# Patient Record
Sex: MALE | Race: WHITE | NOT HISPANIC OR LATINO | Employment: OTHER | URBAN - METROPOLITAN AREA
[De-identification: names, ages, dates, MRNs, and addresses within clinical notes are randomized per-mention and may not be internally consistent; named-entity substitution may affect disease eponyms.]

---

## 2020-06-03 ENCOUNTER — HOSPITAL ENCOUNTER (OUTPATIENT)
Dept: GENERAL RADIOLOGY | Facility: HOSPITAL | Age: 70
Discharge: HOME | End: 2020-06-03
Admitting: PHYSICIAN ASSISTANT

## 2020-06-03 ENCOUNTER — TRANSCRIBE ORDERS (OUTPATIENT)
Dept: ADMINISTRATIVE | Facility: HOSPITAL | Age: 70
End: 2020-06-03

## 2020-06-03 ENCOUNTER — HOSPITAL ENCOUNTER (OUTPATIENT)
Dept: GENERAL RADIOLOGY | Facility: HOSPITAL | Age: 70
Discharge: HOME | End: 2020-06-03

## 2020-06-03 DIAGNOSIS — R10.9 ACUTE LEFT FLANK PAIN: ICD-10-CM

## 2020-06-03 DIAGNOSIS — R10.9 LEFT FLANK PAIN: Primary | ICD-10-CM

## 2020-06-03 DIAGNOSIS — R10.9 ACUTE LEFT FLANK PAIN: Primary | ICD-10-CM

## 2020-06-03 PROCEDURE — 74018 RADEX ABDOMEN 1 VIEW: CPT

## 2020-06-03 PROCEDURE — 72100 X-RAY EXAM L-S SPINE 2/3 VWS: CPT

## 2020-06-11 ENCOUNTER — HOSPITAL ENCOUNTER (OUTPATIENT)
Dept: ULTRASOUND IMAGING | Facility: HOSPITAL | Age: 70
Discharge: HOME | End: 2020-06-11
Admitting: PHYSICIAN ASSISTANT

## 2020-06-11 DIAGNOSIS — R10.9 LEFT FLANK PAIN: ICD-10-CM

## 2020-06-11 PROCEDURE — 76775 US EXAM ABDO BACK WALL LIM: CPT

## 2021-06-09 ENCOUNTER — HOSPITAL ENCOUNTER (OUTPATIENT)
Facility: HOSPITAL | Age: 71
Discharge: HOME | End: 2021-06-10
Attending: EMERGENCY MEDICINE | Admitting: INTERNAL MEDICINE

## 2021-06-09 ENCOUNTER — ON CAMPUS - OUTPATIENT (OUTPATIENT)
Dept: URBAN - METROPOLITAN AREA HOSPITAL 85 | Facility: HOSPITAL | Age: 71
End: 2021-06-09

## 2021-06-09 ENCOUNTER — APPOINTMENT (OUTPATIENT)
Dept: CT IMAGING | Facility: HOSPITAL | Age: 71
End: 2021-06-09

## 2021-06-09 DIAGNOSIS — R11.2 NAUSEA WITH VOMITING, UNSPECIFIED: ICD-10-CM

## 2021-06-09 DIAGNOSIS — R10.84 GENERALIZED ABDOMINAL PAIN: ICD-10-CM

## 2021-06-09 DIAGNOSIS — R10.13 EPIGASTRIC PAIN: ICD-10-CM

## 2021-06-09 DIAGNOSIS — K29.00 ACUTE GASTRITIS WITHOUT HEMORRHAGE, UNSPECIFIED GASTRITIS TYPE: ICD-10-CM

## 2021-06-09 DIAGNOSIS — R93.3 ABNORMAL FINDINGS ON DIAGNOSTIC IMAGING OF OTHER PARTS OF DI: ICD-10-CM

## 2021-06-09 DIAGNOSIS — K29.80 DUODENITIS: ICD-10-CM

## 2021-06-09 DIAGNOSIS — R10.84 GENERALIZED ABDOMINAL PAIN: Primary | ICD-10-CM

## 2021-06-09 LAB
ANION GAP SERPL CALCULATED.3IONS-SCNC: 11 MMOL/L (ref 5–15)
BACTERIA UR QL AUTO: ABNORMAL /HPF
BILIRUB UR QL STRIP: NEGATIVE
BUN SERPL-MCNC: 11 MG/DL (ref 8–23)
BUN/CREAT SERPL: 10.8 (ref 7–25)
CALCIUM SPEC-SCNC: 8.9 MG/DL (ref 8.6–10.5)
CHLORIDE SERPL-SCNC: 101 MMOL/L (ref 98–107)
CLARITY UR: ABNORMAL
CO2 SERPL-SCNC: 25 MMOL/L (ref 22–29)
COLOR UR: ABNORMAL
CREAT SERPL-MCNC: 1.02 MG/DL (ref 0.76–1.27)
DEPRECATED RDW RBC AUTO: 45.5 FL (ref 37–54)
ERYTHROCYTE [DISTWIDTH] IN BLOOD BY AUTOMATED COUNT: 14.5 % (ref 12.3–15.4)
GFR SERPL CREATININE-BSD FRML MDRD: 72 ML/MIN/1.73
GLUCOSE SERPL-MCNC: 207 MG/DL (ref 65–99)
GLUCOSE UR STRIP-MCNC: ABNORMAL MG/DL
HCT VFR BLD AUTO: 43.2 % (ref 37.5–51)
HGB BLD-MCNC: 14.5 G/DL (ref 13–17.7)
HGB UR QL STRIP.AUTO: ABNORMAL
HYALINE CASTS UR QL AUTO: ABNORMAL /LPF
KETONES UR QL STRIP: NEGATIVE
LEUKOCYTE ESTERASE UR QL STRIP.AUTO: NEGATIVE
LIPASE SERPL-CCNC: 17 U/L (ref 13–60)
LYMPHOCYTES # BLD MANUAL: 0.88 10*3/MM3 (ref 0.7–3.1)
LYMPHOCYTES NFR BLD MANUAL: 5 % (ref 19.6–45.3)
LYMPHOCYTES NFR BLD MANUAL: 5 % (ref 5–12)
MCH RBC QN AUTO: 29.7 PG (ref 26.6–33)
MCHC RBC AUTO-ENTMCNC: 33.7 G/DL (ref 31.5–35.7)
MCV RBC AUTO: 88.2 FL (ref 79–97)
MONOCYTES # BLD AUTO: 0.88 10*3/MM3 (ref 0.1–0.9)
MUCOUS THREADS URNS QL MICRO: ABNORMAL /HPF
NEUTROPHILS # BLD AUTO: 15.75 10*3/MM3 (ref 1.7–7)
NEUTROPHILS NFR BLD MANUAL: 89 % (ref 42.7–76)
NEUTS BAND NFR BLD MANUAL: 1 % (ref 0–5)
NITRITE UR QL STRIP: NEGATIVE
PH UR STRIP.AUTO: 6 [PH] (ref 5–8)
PLAT MORPH BLD: NORMAL
PLATELET # BLD AUTO: 259 10*3/MM3 (ref 140–450)
PMV BLD AUTO: 7.1 FL (ref 6–12)
POTASSIUM SERPL-SCNC: 4.5 MMOL/L (ref 3.5–5.2)
PROT UR QL STRIP: NEGATIVE
RBC # BLD AUTO: 4.89 10*6/MM3 (ref 4.14–5.8)
RBC # UR: ABNORMAL /HPF
RBC MORPH BLD: NORMAL
REF LAB TEST METHOD: ABNORMAL
SARS-COV-2 RNA PNL SPEC NAA+PROBE: NOT DETECTED
SCAN SLIDE: NORMAL
SODIUM SERPL-SCNC: 137 MMOL/L (ref 136–145)
SP GR UR STRIP: 1.03 (ref 1–1.03)
SQUAMOUS #/AREA URNS HPF: ABNORMAL /HPF
UROBILINOGEN UR QL STRIP: ABNORMAL
WBC # BLD AUTO: 17.5 10*3/MM3 (ref 3.4–10.8)
WBC MORPH BLD: NORMAL
WBC UR QL AUTO: ABNORMAL /HPF
WHOLE BLOOD HOLD SPECIMEN: NORMAL

## 2021-06-09 PROCEDURE — G0378 HOSPITAL OBSERVATION PER HR: HCPCS

## 2021-06-09 PROCEDURE — 63710000001 PANTOPRAZOLE 40 MG TABLET DELAYED-RELEASE: Performed by: INTERNAL MEDICINE

## 2021-06-09 PROCEDURE — 81001 URINALYSIS AUTO W/SCOPE: CPT | Performed by: EMERGENCY MEDICINE

## 2021-06-09 PROCEDURE — P9612 CATHETERIZE FOR URINE SPEC: HCPCS

## 2021-06-09 PROCEDURE — 87635 SARS-COV-2 COVID-19 AMP PRB: CPT | Performed by: EMERGENCY MEDICINE

## 2021-06-09 PROCEDURE — 99221 1ST HOSP IP/OBS SF/LOW 40: CPT | Performed by: SURGERY

## 2021-06-09 PROCEDURE — 80048 BASIC METABOLIC PNL TOTAL CA: CPT | Performed by: EMERGENCY MEDICINE

## 2021-06-09 PROCEDURE — A9270 NON-COVERED ITEM OR SERVICE: HCPCS | Performed by: INTERNAL MEDICINE

## 2021-06-09 PROCEDURE — 85025 COMPLETE CBC W/AUTO DIFF WBC: CPT | Performed by: EMERGENCY MEDICINE

## 2021-06-09 PROCEDURE — 96375 TX/PRO/DX INJ NEW DRUG ADDON: CPT

## 2021-06-09 PROCEDURE — C9803 HOPD COVID-19 SPEC COLLECT: HCPCS

## 2021-06-09 PROCEDURE — 99283 EMERGENCY DEPT VISIT LOW MDM: CPT

## 2021-06-09 PROCEDURE — 25010000002 MORPHINE PER 10 MG: Performed by: EMERGENCY MEDICINE

## 2021-06-09 PROCEDURE — 74176 CT ABD & PELVIS W/O CONTRAST: CPT

## 2021-06-09 PROCEDURE — 99203 OFFICE O/P NEW LOW 30 MIN: CPT | Performed by: NURSE PRACTITIONER

## 2021-06-09 PROCEDURE — 96374 THER/PROPH/DIAG INJ IV PUSH: CPT

## 2021-06-09 PROCEDURE — 83690 ASSAY OF LIPASE: CPT | Performed by: NURSE PRACTITIONER

## 2021-06-09 PROCEDURE — 25010000002 ONDANSETRON PER 1 MG: Performed by: EMERGENCY MEDICINE

## 2021-06-09 PROCEDURE — 85007 BL SMEAR W/DIFF WBC COUNT: CPT | Performed by: EMERGENCY MEDICINE

## 2021-06-09 RX ORDER — PANTOPRAZOLE SODIUM 40 MG/1
40 TABLET, DELAYED RELEASE ORAL
Status: DISCONTINUED | OUTPATIENT
Start: 2021-06-09 | End: 2021-06-10 | Stop reason: HOSPADM

## 2021-06-09 RX ORDER — MORPHINE SULFATE 4 MG/ML
2 INJECTION, SOLUTION INTRAMUSCULAR; INTRAVENOUS
Status: DISCONTINUED | OUTPATIENT
Start: 2021-06-09 | End: 2021-06-10 | Stop reason: HOSPADM

## 2021-06-09 RX ORDER — SODIUM CHLORIDE 9 MG/ML
INJECTION, SOLUTION INTRAVENOUS CONTINUOUS
Status: CANCELLED | OUTPATIENT
Start: 2021-06-09

## 2021-06-09 RX ORDER — MORPHINE SULFATE 4 MG/ML
2 INJECTION, SOLUTION INTRAMUSCULAR; INTRAVENOUS
Status: CANCELLED | OUTPATIENT
Start: 2021-06-09 | End: 2021-06-16

## 2021-06-09 RX ORDER — ONDANSETRON 2 MG/ML
4 INJECTION INTRAMUSCULAR; INTRAVENOUS ONCE
Status: COMPLETED | OUTPATIENT
Start: 2021-06-09 | End: 2021-06-09

## 2021-06-09 RX ORDER — SODIUM CHLORIDE 9 MG/ML
20 INJECTION, SOLUTION INTRAVENOUS CONTINUOUS
Status: DISCONTINUED | OUTPATIENT
Start: 2021-06-09 | End: 2021-06-10 | Stop reason: HOSPADM

## 2021-06-09 RX ORDER — MORPHINE SULFATE 4 MG/ML
2 INJECTION, SOLUTION INTRAMUSCULAR; INTRAVENOUS ONCE
Status: COMPLETED | OUTPATIENT
Start: 2021-06-09 | End: 2021-06-09

## 2021-06-09 RX ORDER — SODIUM CHLORIDE 0.9 % (FLUSH) 0.9 %
10 SYRINGE (ML) INJECTION AS NEEDED
Status: DISCONTINUED | OUTPATIENT
Start: 2021-06-09 | End: 2021-06-10 | Stop reason: HOSPADM

## 2021-06-09 RX ADMIN — SODIUM CHLORIDE 20 ML/HR: 9 INJECTION, SOLUTION INTRAVENOUS at 20:08

## 2021-06-09 RX ADMIN — PANTOPRAZOLE SODIUM 40 MG: 40 TABLET, DELAYED RELEASE ORAL at 17:25

## 2021-06-09 RX ADMIN — FAMOTIDINE 20 MG: 10 INJECTION INTRAVENOUS at 12:13

## 2021-06-09 RX ADMIN — MORPHINE SULFATE 2 MG: 4 INJECTION INTRAVENOUS at 10:45

## 2021-06-09 RX ADMIN — ONDANSETRON 4 MG: 2 INJECTION INTRAMUSCULAR; INTRAVENOUS at 10:46

## 2021-06-09 NOTE — ED NOTES
Patient complains of lower right side abdominal pain that started last night. Has had some vomiting and diarrhea     Smita Low, RN  06/09/21 3160

## 2021-06-09 NOTE — CONSULTS
Subjective   Alex Guevara is a 70 y.o. male.     History of present illness  This is a pleasant 70 yr/o male admitted through the ED with abdominal pain for the past 24 hrs. Work up in the ED shows marked inflammation in the RUQ and epigastric region. No fevers, no nausea, no vomiting. Clinically concerned for gastritis vs peptic ulcer disease.     History reviewed. No pertinent past medical history.    Past Surgical History:   Procedure Laterality Date   • EYE SURGERY         [unfilled]    No Known Allergies    History reviewed. No pertinent family history.    Social History     Socioeconomic History   • Marital status:      Spouse name: Not on file   • Number of children: Not on file   • Years of education: Not on file   • Highest education level: Not on file   Tobacco Use   • Smoking status: Never Smoker   Substance and Sexual Activity   • Alcohol use: Never   • Drug use: Never   • Sexual activity: Defer       The following portions of the patient's history were reviewed and updated as appropriate: allergies, current medications, past family history, past medical history, past social history, past surgical history and problem list.    Objective      Complete ROS done and unremarkable with exception of chief complaint and above noted exceptions.     Physical Exam: Pleasant 70 yr/o male. HEENT: unremarkable. Heart and lungs unremarkable. Abdomen tender in the RUQ, no true guarding or rebound, no palpable mass. Extremities with equal ROM and usage. Neuro with no focal deficit.     Impression: RUQ and epigastric pain, concerning for severe peptic disease.     Recommendation: GI consult with possible endoscopy. We will follow with you.       Assessment/Plan   Diagnoses and all orders for this visit:    1. Generalized abdominal pain (Primary)    2. Acute gastritis without hemorrhage, unspecified gastritis type  -     Case Request; Standing  -     Case Request    3. Duodenitis  -     Case Request; Standing  -      Case Request    4. Epigastric pain  -     Case Request; Standing  -     Case Request    Other orders  -     Insert peripheral IV; Standing  -     sodium chloride 0.9 % flush 10 mL  -     CBC & Differential; Standing  -     Basic Metabolic Panel; Standing  -     Urinalysis With Microscopic If Indicated (No Culture) - Urine, Catheter; Standing  -     Morphine sulfate (PF) injection 2 mg  -     ondansetron (ZOFRAN) injection 4 mg  -     Insert peripheral IV  -     CBC & Differential  -     Basic Metabolic Panel  -     Urinalysis With Microscopic If Indicated (No Culture) - Urine, Catheter  -     Extra Tubes; Standing  -     Extra Tubes  -     CT Abdomen Pelvis Without Contrast; Standing  -     CT Abdomen Pelvis Without Contrast  -     Urinalysis, Microscopic Only - Urine, Clean Catch; Standing  -     Urinalysis, Microscopic Only - Urine, Catheter  -     Manual Differential; Standing  -     Manual Differential  -     Family Medicine Consult; Standing  -     famotidine (PEPCID) injection 20 mg  -     Family Medicine Consult  -     Surgery (on-call MD unless specified); Standing  -     Surgery (on-call MD unless specified)  -     Gastroenterology (on-call MD unless specified); Standing  -     Gastroenterology (on-call MD unless specified)  -     Inpatient Admission; Standing  -     Inpatient Admission  -     COVID PRE-OP / PRE-PROCEDURE SCREENING ORDER (NO ISOLATION) - Swab, Nasopharynx; Standing  -     COVID PRE-OP / PRE-PROCEDURE SCREENING ORDER (NO ISOLATION) - Swab, Nasopharynx  -     pantoprazole (PROTONIX) EC tablet 40 mg  -     Implement Anesthesia Orders Day of Procedure; Standing  -     Obtain Informed Consent; Standing  -     NPO Diet; Standing  -     Saline Lock & Maintain IV Access; Standing  -     NPO Diet  -     CBC & Differential; Standing  -     Comprehensive Metabolic Panel; Standing  -     Lipase; Standing  -     Lipase; Standing  -     Lipase  -     Morphine sulfate (PF) injection 2 mg  -     sodium  chloride 0.9 % infusion  -     Morphine sulfate (PF) injection 2 mg  -     sodium chloride 0.9 % infusion                   Artemio Rosales DO  6/9/2021  19:12 EDT

## 2021-06-09 NOTE — CONSULTS
GI CONSULT  NOTE:    Referring Provider:  Dr. Orta    Chief complaint: Abdominal pain     Subjective .     History of present illness: Patient is a 70-year-old male with history of inguinal hernia repairs who presented to the emergency room today with complaints of epigastric pain and nausea/vomiting.  Patient reports that abdominal pain began last night and has been fairly constant.  He reports small amounts of nausea/vomiting with no hematemesis.  He feels his symptoms are related to food poisoning as he ate some slaw that was possibly outdated.  Denies eating from a restaurant.  He has no complaints of heartburn or dysphagia.  No fevers or chills.  He has been eating well and denies weight loss.  Bowels move 3-4 times daily which is his normal.  No diarrhea.  He denies bright red blood per rectum or melena.  No NSAID use.    Endo History:  Patient reports colonoscopy in [place] by Dr. Leong a couple years ago with 2 colon polyps.    Past Medical History:  History reviewed. No pertinent past medical history.    Past Surgical History:  Past Surgical History:   Procedure Laterality Date   • EYE SURGERY         Social History:  Social History     Tobacco Use   • Smoking status: Never Smoker   Substance Use Topics   • Alcohol use: Never   • Drug use: Never       Family History:  History reviewed. No pertinent family history.    Medications:  No medications prior to admission.       Scheduled Meds:   Continuous Infusions:   PRN Meds:.•  [COMPLETED] Insert peripheral IV **AND** sodium chloride    ALLERGIES:  Patient has no known allergies.    ROS:  Review of Systems   Constitutional: Negative for chills and fever.   Respiratory: Negative for cough and shortness of breath.    Cardiovascular: Negative for chest pain and palpitations.   Gastrointestinal: Positive for abdominal pain, nausea and vomiting. Negative for blood in stool.   Musculoskeletal: Negative for arthralgias and back pain.   Neurological: Negative for  "dizziness and weakness.   Psychiatric/Behavioral: Negative for agitation and confusion.       Objective     Vital Signs:   Visit Vitals  /72 (BP Location: Left arm, Patient Position: Lying)   Pulse 80   Temp 99.2 °F (37.3 °C) (Oral)   Resp 15   Ht 182.9 cm (72\")   Wt 69.8 kg (153 lb 14.1 oz)   SpO2 98%   BMI 20.87 kg/m²       Physical Exam:      General Appearance:    Awake and alert, in no acute distress   Head:    Normocephalic, without obvious abnormality, atraumatic   Eyes:            Conjunctivae normal, anicteric sclera   Ears:    Ears appear intact with no abnormalities noted   Throat:   No oral lesions, no thrush, oral mucosa moist   Neck:   No adenopathy, supple, no thyromegaly, no JVD   Lungs:     Respirations regular, even and unlabored       Chest Wall:    No abnormalities observed   Abdomen:     Soft, tender epigastric area, no rebound or guarding, non-distended, no hepatosplenomegaly   Rectal:     Deferred   Extremities:   Moves all extremities well, no edema, no cyanosis, no             redness   Pulses:   Pulses palpable and equal bilaterally   Skin:   No bleeding, bruising or rash, no jaundice   Lymph nodes:   No palpable adenopathy   Neurologic:   Cranial nerves 2 - 12 grossly intact, no asterixis, sensation intact       Results Review:   I reviewed the patient's labs and imaging.  CBC  Results from last 7 days   Lab Units 06/09/21  1041   RBC 10*6/mm3 4.89   WBC 10*3/mm3 17.50*   HEMOGLOBIN g/dL 14.5   PLATELETS 10*3/mm3 259       CMP  Results from last 7 days   Lab Units 06/09/21  1041   SODIUM mmol/L 137   POTASSIUM mmol/L 4.5   CHLORIDE mmol/L 101   CO2 mmol/L 25.0   BUN mg/dL 11   CREATININE mg/dL 1.02   GLUCOSE mg/dL 207*       Amylase and Lipase        CRP         Imaging Results (Last 24 Hours)     Procedure Component Value Units Date/Time    CT Abdomen Pelvis Without Contrast [[id number]] Collected: 06/09/21 1115     Updated: 06/09/21 1130    Narrative:      EXAM: CT ABDOMEN PELVIS WO " CONTRAST-     DATE OF EXAM: 6/9/2021 11:06 AM     INDICATION: Abdominal pain, acute, nonlocalized.  Nausea and vomiting     COMPARISON: None available     TECHNIQUE: Contiguous axial CT images were obtained from the lung bases  to the pubic symphysis without contrast. Sagittal and coronal  reconstructions were performed.  Automated exposure control and  iterative reconstruction methods were used.     FINDINGS:  The lack of intravenous contrast limits the evaluation of visceral and  vascular structures.     The heart size is normal. There is no pericardial effusion. The lung  bases are clear. The liver is normal in size and contour. There is a  low-density lesion within the right hepatic lobe with Hounsfield unit  density of 9, likely representing a small hepatic cyst.     The gallbladder is collapsed which limits evaluation. There is no  intrahepatic or extra hepatic biliary ductal dilatation. The spleen and  adrenal glands appear within normal limits. There is mild pancreatic  parenchymal volume loss.     The kidneys are symmetric in size. There is no hydronephrosis. There is  a 4 mm stone within the dependent portion of the right urinary bladder  which may reflect a previously passed stone. Correlate clinically for  history of renal stones/renal colic. There is a tiny focus of gas within  the nondependent portion of the bladder which may reflect recent  instrumentation. The prostate is enlarged.     There is a tiny sliding-type hiatal hernia. There is an inflammatory  process centered around the distal aspect of the stomach and first  portion of the duodenum. This is suspicious for underlying ulcer or  inflammation of the distal stomach/duodenum. A clear ulceration is not  well seen on the noncontrast examination. The inflammation appears  distinctly separate from the pancreas making pancreatitis an unlikely  source. There is no free air to suggest perforation. There is no  abnormal fluid collection or abscess.  The remainder of the small bowel  is normal in caliber without evidence of obstruction. The cecum is  displaced medially likely related to a long mesenteric pedicle. There is  no clear evidence of volvulus at this time. The appendix is not clearly  visualized. There is sigmoid diverticulosis without acute  diverticulitis. The aorta is normal in caliber without evidence of  aneurysm formation. There is no lymphadenopathy within the abdomen or  pelvis. There are small mesenteric reactive lymph nodes. There is  evidence of prior bilateral inguinal hernia repair. There are no acute  osseous findings. There are degenerative changes of the lumbar spine and  pelvis.       Impression:      1. Localized inflammatory stranding surrounding the distal stomach and  proximal duodenum. This is suspicious for ulcer or gastritis/duodenitis.  No definite free air to suggest a perforated ulcer. The inflammation  appears to involve portions of the hepatic flexure.  2. Small 4 mm stone within the dependent portion of the urinary bladder.  This could relate to a recently passed stone if there is history of  renal stones or renal colic or a stone formed due to stasis within the  urinary bladder. Correlate clinically with history and symptoms.  3. Medialization of the cecum to the left lower quadrant without  evidence of volvulus at this time.  4. Sigmoid diverticulosis without acute diverticulitis.           Electronically Signed By-Kenyon Hyde MD On:6/9/2021 11:28 AM  This report was finalized on [id number] by  Kenyon Hyde MD.            ASSESSMENT AND PLAN:    Epigastric pain  Nausea/vomiting  Abnormal CT suggesting inflammatory stranding of distal stomach and proximal duodenum  Hematuria  History of bilateral inguinal hernia repairs    Active Problems:    Generalized abdominal pain     Plan:  70-year-old male presented to the hospital with epigastric pain that began last night.  Also has associated nausea/vomiting.  CT  suggests inflammatory stranding surrounding distal stomach and proximal duodenum.  Concern for ulcer and plan to proceed with EGD in the morning for further evaluation.  Hemoglobin normal at 14.5.  WBC 17.5.  Start PPI twice daily.  Patient can have clear liquid diet now and n.p.o. at midnight.  Supportive care.    I discussed the patients findings and my recommendations with the patient.  Tracie Fowler, MAHAMED  06/09/21  16:13 EDT

## 2021-06-09 NOTE — ED NOTES
Patient flagged for simple sepsis Dr Barber notified and said not to worry about that. Patient did not need sepsis work up done     Smita Low RN  06/09/21 1828

## 2021-06-09 NOTE — ED PROVIDER NOTES
Subjective   Patient is a 7-year-old male complaining of 24-hour history of abdominal pain and vomiting.  The pain is moderate to severe and constant.  There is no radiation of pain.  Nuys cough fever shortness of breath diarrhea dysuria or other complaint.          Review of Systems  Negative for headache ears throat cough fever chest pain shortness of breath diarrhea dysuria achiness weight loss or other complaint.  A complete reassessment was obtained and is otherwise negative  History reviewed. No pertinent past medical history.    No Known Allergies    Past Surgical History:   Procedure Laterality Date   • EYE SURGERY         History reviewed. No pertinent family history.    Social History     Socioeconomic History   • Marital status:      Spouse name: Not on file   • Number of children: Not on file   • Years of education: Not on file   • Highest education level: Not on file   Tobacco Use   • Smoking status: Never Smoker   Substance and Sexual Activity   • Alcohol use: Never   • Drug use: Never   • Sexual activity: Defer           Objective   Physical Exam  HEENT exam shows TMs to be clear.  Oropharynx clear moist.  Sclerae nonicteric.  Neck has no adenopathy JVD or bruits.  Lungs are clear.  Heart has a regular rate rhythm without murmur gallop.  Chest is nontender.  Abdomen is soft with tenderness on palpation throughout.  Patient has rebound and guarding.  Back has no CVA tenderness.  Procedures           ED Course      Results for orders placed or performed during the hospital encounter of 06/09/21   Basic Metabolic Panel    Specimen: Blood   Result Value Ref Range    Glucose 207 (H) 65 - 99 mg/dL    BUN 11 8 - 23 mg/dL    Creatinine 1.02 0.76 - 1.27 mg/dL    Sodium 137 136 - 145 mmol/L    Potassium 4.5 3.5 - 5.2 mmol/L    Chloride 101 98 - 107 mmol/L    CO2 25.0 22.0 - 29.0 mmol/L    Calcium 8.9 8.6 - 10.5 mg/dL    eGFR Non African Amer 72 >60 mL/min/1.73    BUN/Creatinine Ratio 10.8 7.0 - 25.0     Anion Gap 11.0 5.0 - 15.0 mmol/L   Urinalysis With Microscopic If Indicated (No Culture) - Urine, Catheter    Specimen: Urine, Catheter   Result Value Ref Range    Color, UA Dark Yellow (A) Yellow, Straw    Appearance, UA Cloudy (A) Clear    pH, UA 6.0 5.0 - 8.0    Specific Gravity, UA 1.026 1.005 - 1.030    Glucose,  mg/dL (1+) (A) Negative    Ketones, UA Negative Negative    Bilirubin, UA Negative Negative    Blood, UA Large (3+) (A) Negative    Protein, UA Negative Negative    Leuk Esterase, UA Negative Negative    Nitrite, UA Negative Negative    Urobilinogen, UA 1.0 E.U./dL 0.2 - 1.0 E.U./dL   CBC Auto Differential    Specimen: Blood   Result Value Ref Range    WBC 17.50 (H) 3.40 - 10.80 10*3/mm3    RBC 4.89 4.14 - 5.80 10*6/mm3    Hemoglobin 14.5 13.0 - 17.7 g/dL    Hematocrit 43.2 37.5 - 51.0 %    MCV 88.2 79.0 - 97.0 fL    MCH 29.7 26.6 - 33.0 pg    MCHC 33.7 31.5 - 35.7 g/dL    RDW 14.5 12.3 - 15.4 %    RDW-SD 45.5 37.0 - 54.0 fl    MPV 7.1 6.0 - 12.0 fL    Platelets 259 140 - 450 10*3/mm3   Scan Slide    Specimen: Blood   Result Value Ref Range    Scan Slide     Urinalysis, Microscopic Only - Urine, Catheter    Specimen: Urine, Catheter   Result Value Ref Range    RBC, UA 21-30 (A) None Seen /HPF    WBC, UA 0-2 (A) None Seen /HPF    Bacteria, UA None Seen None Seen /HPF    Squamous Epithelial Cells, UA None Seen None Seen, 0-2 /HPF    Hyaline Casts, UA 0-2 None Seen /LPF    Mucus, UA Small/1+ (A) None Seen, Trace /HPF    Methodology Manual Light Microscopy    Manual Differential    Specimen: Blood   Result Value Ref Range    Neutrophil % 89.0 (H) 42.7 - 76.0 %    Lymphocyte % 5.0 (L) 19.6 - 45.3 %    Monocyte % 5.0 5.0 - 12.0 %    Bands %  1.0 0.0 - 5.0 %    Neutrophils Absolute 15.75 (H) 1.70 - 7.00 10*3/mm3    Lymphocytes Absolute 0.88 0.70 - 3.10 10*3/mm3    Monocytes Absolute 0.88 0.10 - 0.90 10*3/mm3    RBC Morphology Normal Normal    WBC Morphology Normal Normal    Platelet Morphology Normal  Normal   Light Blue Top   Result Value Ref Range    Extra Tube hold for add-on      CT Abdomen Pelvis Without Contrast    Result Date: 6/9/2021  1. Localized inflammatory stranding surrounding the distal stomach and proximal duodenum. This is suspicious for ulcer or gastritis/duodenitis. No definite free air to suggest a perforated ulcer. The inflammation appears to involve portions of the hepatic flexure. 2. Small 4 mm stone within the dependent portion of the urinary bladder. This could relate to a recently passed stone if there is history of renal stones or renal colic or a stone formed due to stasis within the urinary bladder. Correlate clinically with history and symptoms. 3. Medialization of the cecum to the left lower quadrant without evidence of volvulus at this time. 4. Sigmoid diverticulosis without acute diverticulitis.    Electronically Signed By-Kenyon Hyde MD On:6/9/2021 11:28 AM This report was finalized on [id number] by  Kenyon Hyde MD.                                         MDM  Number of Diagnoses or Management Options  Diagnosis management comments: Patient is findings consistent with gastritis and duodenitis based on CT scan.  Patient does have leukocytosis.  There is no evidence of free air or abscess formation.  No obstruction is noted.  Patient was given morphine Zofran and Pepcid.  He has continued moderate to severe pain.  Will be admitted for pain control and evaluation both by GI and surgery.  I did speak to the patient family physician.       Amount and/or Complexity of Data Reviewed  Clinical lab tests: reviewed  Tests in the radiology section of CPT®: reviewed    Risk of Complications, Morbidity, and/or Mortality  Presenting problems: high  Diagnostic procedures: high  Management options: high    Patient Progress  Patient progress: stable      Final diagnoses:   Generalized abdominal pain   Acute gastritis without hemorrhage, unspecified gastritis type   Duodenitis        ED Disposition  ED Disposition     ED Disposition Condition Comment    Decision to Admit            No follow-up provider specified.       Medication List      No changes were made to your prescriptions during this visit.          David Barber MD  06/09/21 4590

## 2021-06-10 ENCOUNTER — ON CAMPUS - OUTPATIENT (OUTPATIENT)
Dept: URBAN - METROPOLITAN AREA HOSPITAL 85 | Facility: HOSPITAL | Age: 71
End: 2021-06-10
Payer: COMMERCIAL

## 2021-06-10 ENCOUNTER — ANESTHESIA EVENT (OUTPATIENT)
Dept: GASTROENTEROLOGY | Facility: HOSPITAL | Age: 71
End: 2021-06-10

## 2021-06-10 ENCOUNTER — ANESTHESIA (OUTPATIENT)
Dept: GASTROENTEROLOGY | Facility: HOSPITAL | Age: 71
End: 2021-06-10

## 2021-06-10 VITALS
RESPIRATION RATE: 18 BRPM | HEIGHT: 72 IN | OXYGEN SATURATION: 97 % | DIASTOLIC BLOOD PRESSURE: 58 MMHG | TEMPERATURE: 98.6 F | BODY MASS INDEX: 20.84 KG/M2 | SYSTOLIC BLOOD PRESSURE: 110 MMHG | WEIGHT: 153.88 LBS | HEART RATE: 74 BPM

## 2021-06-10 VITALS — HEART RATE: 70 BPM | SYSTOLIC BLOOD PRESSURE: 116 MMHG | DIASTOLIC BLOOD PRESSURE: 60 MMHG | OXYGEN SATURATION: 100 %

## 2021-06-10 DIAGNOSIS — R10.13 EPIGASTRIC PAIN: ICD-10-CM

## 2021-06-10 DIAGNOSIS — K29.80 DUODENITIS WITHOUT BLEEDING: ICD-10-CM

## 2021-06-10 DIAGNOSIS — K29.00 ACUTE GASTRITIS WITHOUT BLEEDING: ICD-10-CM

## 2021-06-10 DIAGNOSIS — R93.3 ABNORMAL FINDINGS ON DIAGNOSTIC IMAGING OF OTHER PARTS OF DI: ICD-10-CM

## 2021-06-10 LAB
ALBUMIN SERPL-MCNC: 3.4 G/DL (ref 3.5–5.2)
ALBUMIN/GLOB SERPL: 1.4 G/DL
ALP SERPL-CCNC: 60 U/L (ref 39–117)
ALT SERPL W P-5'-P-CCNC: 9 U/L (ref 1–41)
ANION GAP SERPL CALCULATED.3IONS-SCNC: 9 MMOL/L (ref 5–15)
AST SERPL-CCNC: 10 U/L (ref 1–40)
BASOPHILS # BLD AUTO: 0 10*3/MM3 (ref 0–0.2)
BASOPHILS NFR BLD AUTO: 0.2 % (ref 0–1.5)
BILIRUB SERPL-MCNC: 0.8 MG/DL (ref 0–1.2)
BUN SERPL-MCNC: 12 MG/DL (ref 8–23)
BUN/CREAT SERPL: 12.9 (ref 7–25)
CALCIUM SPEC-SCNC: 8.5 MG/DL (ref 8.6–10.5)
CHLORIDE SERPL-SCNC: 102 MMOL/L (ref 98–107)
CO2 SERPL-SCNC: 26 MMOL/L (ref 22–29)
CREAT SERPL-MCNC: 0.93 MG/DL (ref 0.76–1.27)
DEPRECATED RDW RBC AUTO: 44.2 FL (ref 37–54)
EOSINOPHIL # BLD AUTO: 0 10*3/MM3 (ref 0–0.4)
EOSINOPHIL NFR BLD AUTO: 0.2 % (ref 0.3–6.2)
ERYTHROCYTE [DISTWIDTH] IN BLOOD BY AUTOMATED COUNT: 14.4 % (ref 12.3–15.4)
GFR SERPL CREATININE-BSD FRML MDRD: 80 ML/MIN/1.73
GLOBULIN UR ELPH-MCNC: 2.5 GM/DL
GLUCOSE SERPL-MCNC: 103 MG/DL (ref 65–99)
HCT VFR BLD AUTO: 39.3 % (ref 37.5–51)
HGB BLD-MCNC: 13.2 G/DL (ref 13–17.7)
LIPASE SERPL-CCNC: 22 U/L (ref 13–60)
LYMPHOCYTES # BLD AUTO: 1.5 10*3/MM3 (ref 0.7–3.1)
LYMPHOCYTES NFR BLD AUTO: 9.9 % (ref 19.6–45.3)
MCH RBC QN AUTO: 29.7 PG (ref 26.6–33)
MCHC RBC AUTO-ENTMCNC: 33.6 G/DL (ref 31.5–35.7)
MCV RBC AUTO: 88.6 FL (ref 79–97)
MONOCYTES # BLD AUTO: 0.8 10*3/MM3 (ref 0.1–0.9)
MONOCYTES NFR BLD AUTO: 5.4 % (ref 5–12)
NEUTROPHILS NFR BLD AUTO: 13 10*3/MM3 (ref 1.7–7)
NEUTROPHILS NFR BLD AUTO: 84.3 % (ref 42.7–76)
NRBC BLD AUTO-RTO: 0.1 /100 WBC (ref 0–0.2)
PLATELET # BLD AUTO: 212 10*3/MM3 (ref 140–450)
PMV BLD AUTO: 7.4 FL (ref 6–12)
POTASSIUM SERPL-SCNC: 4.4 MMOL/L (ref 3.5–5.2)
PROCALCITONIN SERPL-MCNC: 0.79 NG/ML (ref 0–0.25)
PROT SERPL-MCNC: 5.9 G/DL (ref 6–8.5)
RBC # BLD AUTO: 4.43 10*6/MM3 (ref 4.14–5.8)
SODIUM SERPL-SCNC: 137 MMOL/L (ref 136–145)
WBC # BLD AUTO: 15.4 10*3/MM3 (ref 3.4–10.8)

## 2021-06-10 PROCEDURE — A9270 NON-COVERED ITEM OR SERVICE: HCPCS | Performed by: INTERNAL MEDICINE

## 2021-06-10 PROCEDURE — 63710000001 PANTOPRAZOLE 40 MG TABLET DELAYED-RELEASE: Performed by: INTERNAL MEDICINE

## 2021-06-10 PROCEDURE — 88342 IMHCHEM/IMCYTCHM 1ST ANTB: CPT | Performed by: INTERNAL MEDICINE

## 2021-06-10 PROCEDURE — 80053 COMPREHEN METABOLIC PANEL: CPT | Performed by: NURSE PRACTITIONER

## 2021-06-10 PROCEDURE — 85025 COMPLETE CBC W/AUTO DIFF WBC: CPT | Performed by: NURSE PRACTITIONER

## 2021-06-10 PROCEDURE — 88305 TISSUE EXAM BY PATHOLOGIST: CPT | Performed by: INTERNAL MEDICINE

## 2021-06-10 PROCEDURE — G0378 HOSPITAL OBSERVATION PER HR: HCPCS

## 2021-06-10 PROCEDURE — 25010000002 PROPOFOL 10 MG/ML EMULSION: Performed by: ANESTHESIOLOGY

## 2021-06-10 PROCEDURE — 83690 ASSAY OF LIPASE: CPT | Performed by: NURSE PRACTITIONER

## 2021-06-10 PROCEDURE — 43239 EGD BIOPSY SINGLE/MULTIPLE: CPT | Performed by: INTERNAL MEDICINE

## 2021-06-10 PROCEDURE — 84145 PROCALCITONIN (PCT): CPT | Performed by: FAMILY MEDICINE

## 2021-06-10 RX ORDER — ONDANSETRON 2 MG/ML
4 INJECTION INTRAMUSCULAR; INTRAVENOUS EVERY 6 HOURS PRN
Status: DISCONTINUED | OUTPATIENT
Start: 2021-06-10 | End: 2021-06-10 | Stop reason: HOSPADM

## 2021-06-10 RX ORDER — LIDOCAINE HYDROCHLORIDE 10 MG/ML
INJECTION, SOLUTION EPIDURAL; INFILTRATION; INTRACAUDAL; PERINEURAL AS NEEDED
Status: DISCONTINUED | OUTPATIENT
Start: 2021-06-10 | End: 2021-06-10 | Stop reason: SURG

## 2021-06-10 RX ORDER — PROPOFOL 10 MG/ML
VIAL (ML) INTRAVENOUS AS NEEDED
Status: DISCONTINUED | OUTPATIENT
Start: 2021-06-10 | End: 2021-06-10 | Stop reason: SURG

## 2021-06-10 RX ORDER — DEXTROSE AND SODIUM CHLORIDE 5; .9 G/100ML; G/100ML
125 INJECTION, SOLUTION INTRAVENOUS CONTINUOUS
Status: DISCONTINUED | OUTPATIENT
Start: 2021-06-10 | End: 2021-06-10 | Stop reason: HOSPADM

## 2021-06-10 RX ORDER — PANTOPRAZOLE SODIUM 40 MG/1
40 TABLET, DELAYED RELEASE ORAL
Qty: 60 TABLET | Refills: 3 | Status: SHIPPED | OUTPATIENT
Start: 2021-06-11

## 2021-06-10 RX ADMIN — PROPOFOL 80 MG: 10 INJECTION, EMULSION INTRAVENOUS at 10:28

## 2021-06-10 RX ADMIN — LIDOCAINE HYDROCHLORIDE 50 MG: 10 INJECTION, SOLUTION EPIDURAL; INFILTRATION; INTRACAUDAL; PERINEURAL at 10:28

## 2021-06-10 RX ADMIN — PROPOFOL 20 MG: 10 INJECTION, EMULSION INTRAVENOUS at 10:31

## 2021-06-10 RX ADMIN — PANTOPRAZOLE SODIUM 40 MG: 40 TABLET, DELAYED RELEASE ORAL at 16:51

## 2021-06-10 RX ADMIN — DEXTROSE AND SODIUM CHLORIDE 125 ML/HR: 5; 900 INJECTION, SOLUTION INTRAVENOUS at 11:10

## 2021-06-10 NOTE — PLAN OF CARE
Goal Outcome Evaluation:         EGD this am and tolerated well. Pt is wanting to go home today. Notified Dr. Orta and he said he could possibly discharge later today. Tolerating regular diet. Son at bedside.

## 2021-06-10 NOTE — CASE MANAGEMENT/SOCIAL WORK
Discharge Planning Assessment   [place]     Patient Name: Alex Guevara  MRN: [id number]  Today's Date: 6/10/2021    Admit Date: 6/9/2021    Discharge Needs Assessment     Row Name 06/10/21 1329       Living Environment    Lives With  child(luiz), adult    Current Living Arrangements  home/apartment/condo    Primary Care Provided by  self    Provides Primary Care For  no one    Family Caregiver if Needed  child(luiz), adult    Quality of Family Relationships  helpful    Able to Return to Prior Arrangements  yes       Resource/Environmental Concerns    Resource/Environmental Concerns  none    Transportation Concerns  car, none       Transition Planning    Patient/Family Anticipates Transition to  home with family    Patient/Family Anticipated Services at Transition  none    Transportation Anticipated  family or friend will provide       Discharge Needs Assessment    Readmission Within the Last 30 Days  no previous admission in last 30 days    Equipment Currently Used at Home  none    Concerns to be Addressed  denies needs/concerns at this time;no discharge needs identified    Anticipated Changes Related to Illness  none    Equipment Needed After Discharge  none    Provided Post Acute Provider List?  N/A        Discharge Plan     Row Name 06/10/21 7487       Plan    Plan  Anticipate routine home    Patient/Family in Agreement with Plan  yes    Plan Comments  Met with patient at bedside, reports he lives at home with son. I with ADLs, still drives. Son to transport home on d/c. PCP and pharmacy confirmed. No issues affording food or medications. Currently denies any d/c needs or concerns. DC barriers: potential d/c home today if able to tolerate PO diet          Expected Discharge Date and Time     Expected Discharge Date Expected Discharge Time    Jason 10, 2021         Demographic Summary     Row Name 06/10/21 1329       General Information    Admission Type  inpatient    Arrived From  emergency department    Required  Notices Provided  Important Message from Medicare    Referral Source  admission list    Reason for Consult  discharge planning    Preferred Language  English        Functional Status     Row Name 06/10/21 1329       Functional Status    Usual Activity Tolerance  moderate    Current Activity Tolerance  moderate       Functional Status, IADL    Medications  independent    Meal Preparation  independent    Housekeeping  independent    Laundry  independent    Shopping  independent          Patient Forms     Row Name 06/10/21 1331       Patient Forms    Important Message from Medicare (IMM)  Delivered IM delivered 6/9        Met with patient in room wearing PPE: mask, goggles.      Maintained distance greater than six feet and spent less than 15 minutes in the room.          Angela Hendrickson RN

## 2021-06-10 NOTE — H&P
Patient Care Team:  Ki Orta MD as PCP - General (Family Medicine)    Chief Conplaint  Subjective     The patient is a 70 y.o. male presents with acute abdominal pain primarily in the midepigastrium with nausea and vomiting with an abnormal CT of the gastric antrum.    HPI  The patient was in his usual state of health until 1 day prior to presentation when he began to experience some epigastric pain with nausea and vomiting.  He denied hematemesis or hyperpyrexia or diarrhea.  He denied hematochezia lower extremity erythema edema tenderness hemoptysis orthopnea or other constitutional complaint  Review of Systems  Review of Systems   Constitutional: Positive for activity change and appetite change.   Respiratory: Negative.    Cardiovascular: Negative.    Gastrointestinal: Positive for abdominal pain, nausea and vomiting. Negative for anal bleeding, blood in stool and diarrhea.   Genitourinary: Negative.    Neurological: Negative.        History  History reviewed. No pertinent past medical history.  Past Surgical History:   Procedure Laterality Date   • EYE SURGERY       History reviewed. No pertinent family history.  Social History     Tobacco Use   • Smoking status: Never Smoker   Substance Use Topics   • Alcohol use: Never   • Drug use: Never     Allergies:  Patient has no known allergies.    Objective     Vital Signs  Temp:  [97.9 °F (36.6 °C)-99.4 °F (37.4 °C)] 98.9 °F (37.2 °C)  Heart Rate:  [70-80] 70  Resp:  [14-16] 16  BP: (112-135)/(45-72) 123/67      Physical Exam:   Physical Exam  Vitals and nursing note reviewed.   Constitutional:       Appearance: Normal appearance.   Cardiovascular:      Rate and Rhythm: Normal rate.   Pulmonary:      Breath sounds: Normal breath sounds.   Abdominal:      General: There is no distension.      Tenderness: There is abdominal tenderness. There is no guarding or rebound.   Musculoskeletal:         General: Normal range of motion.   Skin:     General: Skin is warm.    Neurological:      General: No focal deficit present.      Mental Status: He is alert and oriented to person, place, and time.              Results Review:   CBC    Results from last 7 days   Lab Units 06/10/21  0314 06/09/21  1041   WBC 10*3/mm3 15.40* 17.50*   HEMOGLOBIN g/dL 13.2 14.5   PLATELETS 10*3/mm3 212 259     BMP   Results from last 7 days   Lab Units 06/10/21  0314 06/09/21  1041   SODIUM mmol/L 137 137   POTASSIUM mmol/L 4.4 4.5   CHLORIDE mmol/L 102 101   CO2 mmol/L 26.0 25.0   BUN mg/dL 12 11   CREATININE mg/dL 0.93 1.02   GLUCOSE mg/dL 103* 207*     Cr Clearance Estimated Creatinine Clearance: 73 mL/min (by C-G formula based on SCr of 0.93 mg/dL).  Coag     HbA1C No results found for: HGBA1C  Blood Glucose No results found for: POCGLU  Infection     CMP   Results from last 7 days   Lab Units 06/10/21  0314 06/09/21  1041   SODIUM mmol/L 137 137   POTASSIUM mmol/L 4.4 4.5   CHLORIDE mmol/L 102 101   CO2 mmol/L 26.0 25.0   BUN mg/dL 12 11   CREATININE mg/dL 0.93 1.02   GLUCOSE mg/dL 103* 207*   ALBUMIN g/dL 3.40*  --    BILIRUBIN mg/dL 0.8  --    ALK PHOS U/L 60  --    AST (SGOT) U/L 10  --    ALT (SGPT) U/L 9  --    LIPASE U/L 22 17     UA    Results from last 7 days   Lab Units 06/09/21  1058   NITRITE UA  Negative   WBC UA /HPF 0-2*   BACTERIA UA /HPF None Seen   SQUAM EPITHEL UA /HPF None Seen     Radiology(recent) CT Abdomen Pelvis Without Contrast    Result Date: 6/9/2021  1. Localized inflammatory stranding surrounding the distal stomach and proximal duodenum. This is suspicious for ulcer or gastritis/duodenitis. No definite free air to suggest a perforated ulcer. The inflammation appears to involve portions of the hepatic flexure. 2. Small 4 mm stone within the dependent portion of the urinary bladder. This could relate to a recently passed stone if there is history of renal stones or renal colic or a stone formed due to stasis within the urinary bladder. Correlate clinically with history and  symptoms. 3. Medialization of the cecum to the left lower quadrant without evidence of volvulus at this time. 4. Sigmoid diverticulosis without acute diverticulitis.    Electronically Signed By-Kenyon Hyde MD On:6/9/2021 11:28 AM This report was finalized on [id number] by  Kenyon Hyde MD.       Assessment:      Generalized abdominal pain    Acute gastritis without hemorrhage    Duodenitis    Epigastric pain  Reactive hyperglycemia    Plan:  Bowel rest//EGD today//plans to follow  Expected Length of Stay 3 days    I discussed the patients findings and my recommendations with patient and nursing staff.     Ki Orta MD  06/10/21  07:56 EDT

## 2021-06-10 NOTE — DISCHARGE SUMMARY
Date of Discharge:  6/10/2021    Discharge Diagnosis:       Generalized abdominal pain  Acute gastritis without hemorrhage  Duodenitis  Reactive hyperglycemia    Presenting Problem/History of Present Illness  Active Hospital Problems    Diagnosis  POA   • Generalized abdominal pain [R10.84]  Yes   • Acute gastritis without hemorrhage [K29.00]  Unknown   • Duodenitis [K29.80]  Unknown   • Epigastric pain [R10.13]  Unknown      Resolved Hospital Problems   No resolved problems to display.          Hospital Course  Patient is a 70 y.o. male who presented with acute abdominal pain.  He received bowel rest and PPI therapy.  He underwent EGD and was found to have an acute gastritis.  He will be sent home on PPI therapy and will F/U with us in one week and with GI in one month.  He will be placed on a bland diet.      Procedures Performed    Procedure(s):  ESOPHAGOGASTRODUODENOSCOPY with random gastric biopsies  -------------------       Consults:   Consults     Date and Time Order Name Status Description    6/9/2021 12:05 PM Gastroenterology (on-call MD unless specified)      6/9/2021 12:05 PM Surgery (on-call MD unless specified)      6/9/2021 11:51 AM Family Medicine Consult Completed           Pertinent Test Results:CT Abdomen Pelvis Without Contrast    Result Date: 6/9/2021  1. Localized inflammatory stranding surrounding the distal stomach and proximal duodenum. This is suspicious for ulcer or gastritis/duodenitis. No definite free air to suggest a perforated ulcer. The inflammation appears to involve portions of the hepatic flexure. 2. Small 4 mm stone within the dependent portion of the urinary bladder. This could relate to a recently passed stone if there is history of renal stones or renal colic or a stone formed due to stasis within the urinary bladder. Correlate clinically with history and symptoms. 3. Medialization of the cecum to the left lower quadrant without evidence of volvulus at this time. 4. Sigmoid  diverticulosis without acute diverticulitis.    Electronically Signed By-Kenyon Hyed MD On:6/9/2021 11:28 AM This report was finalized on [id number] by  Kenyon Hyde MD.      Imaging Results (Last 7 Days)     Procedure Component Value Units Date/Time    CT Abdomen Pelvis Without Contrast [[id number]] Collected: 06/09/21 1115     Updated: 06/09/21 1130    Narrative:      EXAM: CT ABDOMEN PELVIS WO CONTRAST-     DATE OF EXAM: 6/9/2021 11:06 AM     INDICATION: Abdominal pain, acute, nonlocalized.  Nausea and vomiting     COMPARISON: None available     TECHNIQUE: Contiguous axial CT images were obtained from the lung bases  to the pubic symphysis without contrast. Sagittal and coronal  reconstructions were performed.  Automated exposure control and  iterative reconstruction methods were used.     FINDINGS:  The lack of intravenous contrast limits the evaluation of visceral and  vascular structures.     The heart size is normal. There is no pericardial effusion. The lung  bases are clear. The liver is normal in size and contour. There is a  low-density lesion within the right hepatic lobe with Hounsfield unit  density of 9, likely representing a small hepatic cyst.     The gallbladder is collapsed which limits evaluation. There is no  intrahepatic or extra hepatic biliary ductal dilatation. The spleen and  adrenal glands appear within normal limits. There is mild pancreatic  parenchymal volume loss.     The kidneys are symmetric in size. There is no hydronephrosis. There is  a 4 mm stone within the dependent portion of the right urinary bladder  which may reflect a previously passed stone. Correlate clinically for  history of renal stones/renal colic. There is a tiny focus of gas within  the nondependent portion of the bladder which may reflect recent  instrumentation. The prostate is enlarged.     There is a tiny sliding-type hiatal hernia. There is an inflammatory  process centered around the distal aspect  of the stomach and first  portion of the duodenum. This is suspicious for underlying ulcer or  inflammation of the distal stomach/duodenum. A clear ulceration is not  well seen on the noncontrast examination. The inflammation appears  distinctly separate from the pancreas making pancreatitis an unlikely  source. There is no free air to suggest perforation. There is no  abnormal fluid collection or abscess. The remainder of the small bowel  is normal in caliber without evidence of obstruction. The cecum is  displaced medially likely related to a long mesenteric pedicle. There is  no clear evidence of volvulus at this time. The appendix is not clearly  visualized. There is sigmoid diverticulosis without acute  diverticulitis. The aorta is normal in caliber without evidence of  aneurysm formation. There is no lymphadenopathy within the abdomen or  pelvis. There are small mesenteric reactive lymph nodes. There is  evidence of prior bilateral inguinal hernia repair. There are no acute  osseous findings. There are degenerative changes of the lumbar spine and  pelvis.       Impression:      1. Localized inflammatory stranding surrounding the distal stomach and  proximal duodenum. This is suspicious for ulcer or gastritis/duodenitis.  No definite free air to suggest a perforated ulcer. The inflammation  appears to involve portions of the hepatic flexure.  2. Small 4 mm stone within the dependent portion of the urinary bladder.  This could relate to a recently passed stone if there is history of  renal stones or renal colic or a stone formed due to stasis within the  urinary bladder. Correlate clinically with history and symptoms.  3. Medialization of the cecum to the left lower quadrant without  evidence of volvulus at this time.  4. Sigmoid diverticulosis without acute diverticulitis.           Electronically Signed By-Kenyon Hyde MD On:6/9/2021 11:28 AM  This report was finalized on [id number] by  Kenyon  MD Mary Ellen.              Condition on Discharge:  Fair    Vital Signs  Temp:  [97.9 °F (36.6 °C)-98.9 °F (37.2 °C)] 98.6 °F (37 °C)  Heart Rate:  [66-77] 74  Resp:  [15-23] 18  BP: (106-130)/(58-69) 110/58    Physical Exam:     General Appearance:    Alert, cooperative, in no acute distress   Head:    Normocephalic, without obvious abnormality, atraumatic   Eyes:           Conjunctivae and sclerae normal, no   icterus, no pallor, corneas clear, PERRLA   Throat:   No oral lesions, no thrush, oral mucosa moist   Neck:   No adenopathy, supple, trachea midline, no thyromegaly, no   carotid bruit, no JVD   Lungs:     Clear to auscultation,respirations regular, even and                  unlabored    Heart:    Regular rhythm and normal rate, normal S1 and S2, no            murmur, no gallop, no rub, no click   Chest Wall:    No abnormalities observed   Abdomen:     Normal bowel sounds, no masses, no organomegaly, soft        non-tender, non-distended, no guarding, no rebound                tenderness   Rectal:     Deferred   Extremities:   Moves all extremities well, no edema, no cyanosis, no             redness   Pulses:   Pulses palpable and equal bilaterally   Skin:   No bleeding, bruising or rash   Lymph nodes:   No palpable adenopathy   Neurologic:   Cranial nerves 2 - 12 grossly intact, sensation intact, DTR       present and equal bilaterally         Discharge Disposition  Home or Self Care    Discharge Medications     Discharge Medications      New Medications      Instructions Start Date   pantoprazole 40 MG EC tablet  Commonly known as: PROTONIX   40 mg, Oral, 2 Times Daily Before Meals   Start Date: June 11, 2021            Discharge Diet:   [place], cardiac  Activity at Discharge:   As tolerated  Follow-up Appointments  See body of D/C summary      Test Results Pending at Discharge  Pending Labs     Order Current Status    Tissue Pathology Exam In process           Ki Orta MD  06/10/21  17:07  EDT

## 2021-06-10 NOTE — PLAN OF CARE
Goal Outcome Evaluation:      Patient alert and orientated.  NPO diet started at midnight for procedure 6/10. No voiced complaints.  Will continue to monitor.

## 2021-06-10 NOTE — ANESTHESIA POSTPROCEDURE EVALUATION
Patient: Alex Guevara    Procedure Summary     Date: 06/10/21 Room / Location: [facility] ENDOSCOPY 1 / [facility] ENDOSCOPY    Anesthesia Start: 1027 Anesthesia Stop: 1039    Procedure: ESOPHAGOGASTRODUODENOSCOPY with random gastric biopsies (N/A ) Diagnosis:       Acute gastritis without hemorrhage, unspecified gastritis type      Duodenitis      Epigastric pain      (Acute gastritis without hemorrhage, unspecified gastritis type [K29.00])      (Duodenitis [K29.80])      (Epigastric pain [R10.13])    Surgeons: Derek Umaña MD Provider: Ken Larose MD    Anesthesia Type: MAC ASA Status: 2          Anesthesia Type: MAC    Vitals  Vitals Value Taken Time   /60 06/10/21 1055   Temp     Pulse 66 06/10/21 1055   Resp 23 06/10/21 1055   SpO2 95 % 06/10/21 1055           Post Anesthesia Care and Evaluation    Patient location during evaluation: PACU  Patient participation: complete - patient participated  Level of consciousness: awake  Pain scale: See nurse's notes for pain score.  Pain management: adequate  Airway patency: patent  Anesthetic complications: No anesthetic complications  PONV Status: none  Cardiovascular status: acceptable  Respiratory status: acceptable  Hydration status: acceptable    Comments: Patient seen and examined postoperatively; vital signs stable; SpO2 greater than or equal to 90%; cardiopulmonary status stable; nausea/vomiting adequately controlled; pain adequately controlled; no apparent anesthesia complications; patient discharged from anesthesia care when discharge criteria were met

## 2021-06-10 NOTE — OP NOTE
ESOPHAGOGASTRODUODENOSCOPY  Procedure Report    Patient Name:  Alex Guevara  YOB: 1950    Date of Surgery:  6/10/2021     Indications: Epigastric abdominal pain with abnormal CT of the stomach    Pre-op Diagnosis:   Acute gastritis without hemorrhage, unspecified gastritis type [K29.00]  Duodenitis [K29.80]  Epigastric pain [R10.13]         Post-op Diagnosis:  Mild to moderate nonerosive gastritis no evidence of ulcer disease status post biopsy for H. pylori      Procedure(s):  ESOPHAGOGASTRODUODENOSCOPY with random gastric biopsies    Staff:  Surgeon(s):  Derek Umaña MD         Anesthesia: Monitored Anesthesia Care    Estimated Blood Loss: None  Implants:    Nothing was implanted during the procedure    Specimen:          Antral and stomach body biopsies    Complications:  None    Description of Procedure:  Informed consent was obtained from the patient.  They were placed in the left lateral decubitus position and IV conscious sedation was administered by anesthesia while being monitored continuously throughout the procedure.  The video Olympus endoscope was introduced into the esophagus and was advanced under direct vision to the second portion of the duodenum which is normal.  The duodenal bulb was normal.  With close inspection I could not find any ulceration or significant inflammatory process was suggested by CT.  The pylorus is widely patent.  There is a mild to moderate nonerosive gastritis in the antrum which was photographed and biopsied for H. pylori.  Again no ulcer could be identified in the stomach to correlate with what was suggested by CT.  Stomach body fundus and cardia were then inspected thoroughly including retroflexion views and demonstrated no gastric mucosal lesions.  There were no ulcers or erosions varices or other abnormality.  The squamocolumnar line is at the GE junction and forward-viewing there is a very small hiatal hernia the squamocolumnar line shows no  evidence of reflux there is no stricturing.  The remaining esophagus was normal scope was removed he tolerated procedure well returned to recovery good condition.    Impression:  This is a 70-year-old male with epigastric abdominal pain and an abnormal CT of the stomach.  The only endoscopic finding was mild to moderate nonerosive antral gastritis and a small hiatal hernia.  Biopsies were obtained.  Review of the CT shows that the pancreatic parenchyma does not demonstrate changes of pancreatitis and inflammation described in the stomach is distinctly separate from the pancreas.  Amylase and lipase were normal.  He seems to be clinically better on PPI.    Recommendations:  Would be discharged home on a regular diet and PPI therapy for 8 weeks  Follow-up with GI nurse practitioner in 6 to 8 weeks  Call in 3 days for biopsies  Call immediately for any postop problems  We appreciate the referral thank you      Derek Umaña MD     Date: 6/10/2021  Time: 10:38 EDT

## 2021-06-10 NOTE — ANESTHESIA PREPROCEDURE EVALUATION
Anesthesia Evaluation     Patient summary reviewed and Nursing notes reviewed   no history of anesthetic complications:  NPO Solid Status: > 8 hours  NPO Liquid Status: > 8 hours           Airway   Dental      Pulmonary    Cardiovascular         Neuro/Psych  GI/Hepatic/Renal/Endo      Musculoskeletal     Abdominal    Substance History      OB/GYN          Other        ROS/Med Hx Other: Gastritis, duodenitis, epigastric pain, abd pain    PSH  eye                  Anesthesia Plan    ASA 2     MAC   (Patient identified; pre-operative vital signs, all relevant labs/studies, complete medical/surgical/anesthetic history, full medication list, full allergy list, and NPO status obtained/reviewed; physical assessment performed; anesthetic options, side effects, potential complications, risks, and benefits discussed; questions answered; written anesthesia consent obtained; patient cleared for procedure; anesthesia machine and equipment checked and functioning)    Anesthetic plan, all risks, benefits, and alternatives have been provided, discussed and informed consent has been obtained with: patient.    Plan discussed with CRNA and CAA.

## 2021-06-11 LAB
LAB AP CASE REPORT: NORMAL
PATH REPORT.FINAL DX SPEC: NORMAL
PATH REPORT.GROSS SPEC: NORMAL

## 2021-06-11 NOTE — CASE MANAGEMENT/SOCIAL WORK
Case Management Discharge Note      Final Note: Home    Provided Post Acute Provider List?: N/A    Selected Continued Care - Discharged on 6/10/2021 Admission date: 6/9/2021 - Discharge disposition: Home or Self Care                 Final Discharge Disposition Code: 01 - home or self-care

## 2021-07-23 ENCOUNTER — OFFICE (OUTPATIENT)
Dept: URBAN - METROPOLITAN AREA CLINIC 64 | Facility: CLINIC | Age: 71
End: 2021-07-23

## 2021-07-23 VITALS
HEIGHT: 72 IN | HEART RATE: 53 BPM | WEIGHT: 156 LBS | DIASTOLIC BLOOD PRESSURE: 57 MMHG | SYSTOLIC BLOOD PRESSURE: 112 MMHG

## 2021-07-23 DIAGNOSIS — Z86.010 PERSONAL HISTORY OF COLONIC POLYPS: ICD-10-CM

## 2021-07-23 DIAGNOSIS — B96.81 HELICOBACTER PYLORI [H. PYLORI] AS THE CAUSE OF DISEASES CLA: ICD-10-CM

## 2021-07-23 PROCEDURE — 99213 OFFICE O/P EST LOW 20 MIN: CPT | Performed by: NURSE PRACTITIONER

## 2022-05-10 ENCOUNTER — HOSPITAL ENCOUNTER (EMERGENCY)
Facility: HOSPITAL | Age: 72
Discharge: HOME | End: 2022-05-10
Attending: EMERGENCY MEDICINE | Admitting: EMERGENCY MEDICINE

## 2022-05-10 ENCOUNTER — APPOINTMENT (OUTPATIENT)
Dept: GENERAL RADIOLOGY | Facility: HOSPITAL | Age: 72
End: 2022-05-10

## 2022-05-10 VITALS
WEIGHT: 171.96 LBS | HEIGHT: 72 IN | RESPIRATION RATE: 17 BRPM | BODY MASS INDEX: 23.29 KG/M2 | SYSTOLIC BLOOD PRESSURE: 154 MMHG | TEMPERATURE: 98.4 F | OXYGEN SATURATION: 95 % | HEART RATE: 71 BPM | DIASTOLIC BLOOD PRESSURE: 70 MMHG

## 2022-05-10 DIAGNOSIS — M19.032 PRIMARY OSTEOARTHRITIS OF LEFT WRIST: ICD-10-CM

## 2022-05-10 DIAGNOSIS — L03.114 CELLULITIS OF LEFT FOREARM: Primary | ICD-10-CM

## 2022-05-10 PROCEDURE — 73130 X-RAY EXAM OF HAND: CPT

## 2022-05-10 PROCEDURE — 96372 THER/PROPH/DIAG INJ SC/IM: CPT

## 2022-05-10 PROCEDURE — 99283 EMERGENCY DEPT VISIT LOW MDM: CPT

## 2022-05-10 RX ORDER — CLINDAMYCIN HYDROCHLORIDE 150 MG/1
150 CAPSULE ORAL 3 TIMES DAILY
Qty: 30 CAPSULE | Refills: 0 | Status: SHIPPED | OUTPATIENT
Start: 2022-05-10

## 2022-05-10 RX ORDER — CLINDAMYCIN PHOSPHATE 300 MG/50ML
300 INJECTION, SOLUTION INTRAVENOUS ONCE
Status: DISCONTINUED | OUTPATIENT
Start: 2022-05-10 | End: 2022-05-10

## 2022-05-10 RX ORDER — CLINDAMYCIN PHOSPHATE 150 MG/ML
300 INJECTION, SOLUTION INTRAVENOUS ONCE
Status: COMPLETED | OUTPATIENT
Start: 2022-05-10 | End: 2022-05-10

## 2022-05-10 RX ADMIN — CLINDAMYCIN PHOSPHATE 300 MG: 150 INJECTION, SOLUTION INTRAVENOUS at 11:37

## 2022-05-10 NOTE — DISCHARGE INSTRUCTIONS
Use splint for the next 5 days  Can also use Tylenol as needed for pain and swelling  Make sure to finish all of the antibiotics  Follow-up with primary care provider or orthopedist

## 2022-05-10 NOTE — ED PROVIDER NOTES
Subjective   71-year-old male complaining of hand pain swelling and redness.  He reports its been increasing in the last 2 days.  He states he is concerned about the possibly a spider bite but also had an abrasion on his hand.  He reports no decrease in sensation or circulation.  Reports time of the redness has gone on to his forearm.  He reports no other injuries or symptoms          Review of Systems   Constitutional: Positive for chills, fatigue and fever.   Gastrointestinal: Negative for abdominal pain and nausea.   Musculoskeletal: Positive for arthralgias.   Skin: Positive for rash.   All other systems reviewed and are negative.      No past medical history on file.    No Known Allergies    Past Surgical History:   Procedure Laterality Date   • ENDOSCOPY N/A 6/10/2021    Procedure: ESOPHAGOGASTRODUODENOSCOPY with random gastric biopsies;  Surgeon: Derek Umaña MD;  Location: [facility] ENDOSCOPY;  Service: Gastroenterology;  Laterality: N/A;  possible gastritis   • EYE SURGERY         No family history on file.    Social History     Socioeconomic History   • Marital status:    Tobacco Use   • Smoking status: Never Smoker   Substance and Sexual Activity   • Alcohol use: Never   • Drug use: Never   • Sexual activity: Defer           Objective   Physical Exam Examination of the left upper extremity reveals it to be neurovascular intact with normal cap refill and a full range of motion intact there is cellulitic changes noted in the area of the distal radius that extends approximately there is no area of central necrosis or vesiculation consistent with spider bite  Procedures           ED Course                                     Labs Reviewed - No data to display  Medications - No data to display  XR Hand 3+ View Left    Result Date: 5/10/2022  Negative. No fracture or other acute abnormality of the left hand    Electronically Signed By-Vlad Fontanez On:5/10/2022 9:05 AM This report was finalized on  [id number] by  Vlad Fontanez .                 LUCIA  Number of Diagnoses or Management Options     Amount and/or Complexity of Data Reviewed  Tests in the radiology section of CPT®: ordered and reviewed    Risk of Complications, Morbidity, and/or Mortality  Presenting problems: high  Diagnostic procedures: high  Management options: high  General comments: Patient will be started on diclofenac.   and will also be placed on clindamycin he was injected with clindamycin prior to discharge the patient was placed in a splint he was neurovascular intact and vocalized understanding of discharge instructions and warnings discharge        Final diagnoses:   Cellulitis of left forearm   Primary osteoarthritis of left wrist       ED Disposition  ED Disposition     ED Disposition   Discharge    Condition   Stable    Comment   --             No follow-up provider specified.       Medication List      New Prescriptions    clindamycin 150 MG capsule  Commonly known as: CLEOCIN  Take 1 capsule by mouth 3 (Three) Times a Day.     diclofenac 50 MG EC tablet  Commonly known as: VOLTAREN  1 p.o. 3 times daily prn Pain and swelling           Where to Get Your Medications      These medications were sent to [facility] [address] [place] [address] [place] - [address] [facility] - [phone]  - [phone] FX  [address] [facility] [place] [address]    Phone: [phone]   · clindamycin 150 MG capsule  · diclofenac 50 MG EC tablet          Ken Elizabeth MD  05/10/22 8431

## 2022-05-10 NOTE — ED NOTES
Pt reports L hand swelling for a couple of days. Pt reports he thought he was bit by a spider and it has just kept getting worse. Pt reports his had is sore.

## (undated) DEVICE — BITEBLOCK ENDO W/STRAP 60F A/ LF DISP

## (undated) DEVICE — SINGLE-USE BIOPSY FORCEPS: Brand: RADIAL JAW 4

## (undated) DEVICE — PAPR PRNT PK SONY W RIBN UPC55

## (undated) DEVICE — PK ENDO GI 50